# Patient Record
Sex: FEMALE | Race: WHITE
[De-identification: names, ages, dates, MRNs, and addresses within clinical notes are randomized per-mention and may not be internally consistent; named-entity substitution may affect disease eponyms.]

---

## 2022-05-10 ENCOUNTER — HOSPITAL ENCOUNTER (EMERGENCY)
Dept: HOSPITAL 75 - ER | Age: 42
Discharge: HOME | End: 2022-05-10
Payer: COMMERCIAL

## 2022-05-10 VITALS — SYSTOLIC BLOOD PRESSURE: 110 MMHG | DIASTOLIC BLOOD PRESSURE: 44 MMHG

## 2022-05-10 VITALS — HEIGHT: 62.99 IN | BODY MASS INDEX: 43.05 KG/M2 | WEIGHT: 242.95 LBS

## 2022-05-10 DIAGNOSIS — Z32.02: ICD-10-CM

## 2022-05-10 DIAGNOSIS — N83.292: Primary | ICD-10-CM

## 2022-05-10 LAB
ALBUMIN SERPL-MCNC: 4.1 GM/DL (ref 3.2–4.5)
ALP SERPL-CCNC: 107 U/L (ref 40–136)
ALT SERPL-CCNC: 12 U/L (ref 0–55)
APTT PPP: YELLOW S
BACTERIA #/AREA URNS HPF: (no result) /HPF
BASOPHILS # BLD AUTO: 0 10^3/UL (ref 0–0.1)
BASOPHILS NFR BLD AUTO: 0 % (ref 0–10)
BILIRUB SERPL-MCNC: 0.5 MG/DL (ref 0.1–1)
BILIRUB UR QL STRIP: NEGATIVE
BUN/CREAT SERPL: 11
CALCIUM SERPL-MCNC: 9.4 MG/DL (ref 8.5–10.1)
CHLORIDE SERPL-SCNC: 103 MMOL/L (ref 98–107)
CO2 SERPL-SCNC: 19 MMOL/L (ref 21–32)
CREAT SERPL-MCNC: 0.66 MG/DL (ref 0.6–1.3)
EOSINOPHIL # BLD AUTO: 0.1 10^3/UL (ref 0–0.3)
EOSINOPHIL NFR BLD AUTO: 1 % (ref 0–10)
FIBRINOGEN PPP-MCNC: CLEAR MG/DL
GFR SERPLBLD BASED ON 1.73 SQ M-ARVRAT: 114 ML/MIN
GLUCOSE SERPL-MCNC: 197 MG/DL (ref 70–105)
GLUCOSE UR STRIP-MCNC: (no result) MG/DL
HCT VFR BLD CALC: 42 % (ref 35–52)
HGB BLD-MCNC: 13.4 G/DL (ref 11.5–16)
KETONES UR QL STRIP: NEGATIVE
LEUKOCYTE ESTERASE UR QL STRIP: NEGATIVE
LIPASE SERPL-CCNC: 10 U/L (ref 8–78)
LYMPHOCYTES # BLD AUTO: 3.2 10^3/UL (ref 1–4)
LYMPHOCYTES NFR BLD AUTO: 21 % (ref 12–44)
MANUAL DIFFERENTIAL PERFORMED BLD QL: YES
MCH RBC QN AUTO: 25 PG (ref 25–34)
MCHC RBC AUTO-ENTMCNC: 32 G/DL (ref 32–36)
MCV RBC AUTO: 77 FL (ref 80–99)
MONOCYTES # BLD AUTO: 0.7 10^3/UL (ref 0–1)
MONOCYTES NFR BLD AUTO: 5 % (ref 0–12)
MONOCYTES NFR BLD: 4 %
NEUTROPHILS # BLD AUTO: 10.8 10^3/UL (ref 1.8–7.8)
NEUTROPHILS NFR BLD AUTO: 73 % (ref 42–75)
NEUTS BAND NFR BLD MANUAL: 79 %
NITRITE UR QL STRIP: NEGATIVE
PH UR STRIP: 6 [PH] (ref 5–9)
PLATELET # BLD: 538 10^3/UL (ref 130–400)
PMV BLD AUTO: 8.8 FL (ref 9–12.2)
POTASSIUM SERPL-SCNC: 4.1 MMOL/L (ref 3.6–5)
PROT SERPL-MCNC: 8 GM/DL (ref 6.4–8.2)
PROT UR QL STRIP: NEGATIVE
RBC #/AREA URNS HPF: (no result) /HPF
RBC MORPH BLD: NORMAL
SODIUM SERPL-SCNC: 137 MMOL/L (ref 135–145)
SP GR UR STRIP: 1.01 (ref 1.02–1.02)
SQUAMOUS #/AREA URNS HPF: (no result) /HPF
VARIANT LYMPHS NFR BLD MANUAL: 17 %
WBC # BLD AUTO: 15 10^3/UL (ref 4.3–11)
WBC #/AREA URNS HPF: (no result) /HPF

## 2022-05-10 PROCEDURE — 85007 BL SMEAR W/DIFF WBC COUNT: CPT

## 2022-05-10 PROCEDURE — 36415 COLL VENOUS BLD VENIPUNCTURE: CPT

## 2022-05-10 PROCEDURE — 86141 C-REACTIVE PROTEIN HS: CPT

## 2022-05-10 PROCEDURE — 84703 CHORIONIC GONADOTROPIN ASSAY: CPT

## 2022-05-10 PROCEDURE — 80053 COMPREHEN METABOLIC PANEL: CPT

## 2022-05-10 PROCEDURE — 85027 COMPLETE CBC AUTOMATED: CPT

## 2022-05-10 PROCEDURE — 76856 US EXAM PELVIC COMPLETE: CPT

## 2022-05-10 PROCEDURE — 74177 CT ABD & PELVIS W/CONTRAST: CPT

## 2022-05-10 PROCEDURE — 76830 TRANSVAGINAL US NON-OB: CPT

## 2022-05-10 PROCEDURE — 81000 URINALYSIS NONAUTO W/SCOPE: CPT

## 2022-05-10 PROCEDURE — 83690 ASSAY OF LIPASE: CPT

## 2022-05-10 NOTE — DIAGNOSTIC IMAGING REPORT
PROCEDURE: CT abdomen and pelvis with contrast.



TECHNIQUE: Multiple contiguous axial images were obtained through

the abdomen and pelvis after administration of intravenous

contrast. Auto Exposure Controls were utilized during the CT exam

to meet ALARA standards for radiation dose reduction. All CT

scans use one or more of the following dose optimizing

techniques: automated exposure control, MA and/or KvP adjustment

based on patient size and exam type or iterative reconstruction.



INDICATION:  Abdominal pain. Leukocytosis. 



COMPARISON: CT abdomen and pelvis with contrast 06/07/2010.



FINDINGS: Lung bases are clear. There is a small amount of fluid

layering in the colic gutters bilaterally. Nonspecific cyst in

the left ovary measuring up to 4.1 x 3.2 cm. Normal appendix. No

free intraperitoneal air. No evidence of bowel obstruction. No

lymphadenopathy. The liver, gallbladder, pancreas, adrenals,

kidneys, collecting systems and bladder are negative. Borderline

splenomegaly. The endometrium is distended with fluid including a

small amount of blood in the cervical os. No acute osseous

findings.



IMPRESSION: 

1. Small amount of fluid layering in the colic gutters

bilaterally. No free intraperitoneal air.

2. Nonspecific cyst in the left ovary measuring up to 4.1 x 3.2

cm. Recommend further evaluation with pelvic ultrasound.

3. Borderline splenomegaly.



Dictated by: 



  Dictated on workstation # [id number]

## 2022-05-10 NOTE — DIAGNOSTIC IMAGING REPORT
PROCEDURE: US Non-ob pelvis comp/trans.



TECHNIQUE:  Multiple realtime grayscale images were obtained of

the pelvis in various projections endovaginally.



Transabdominal imaging was also performed.



INDICATION:  Abdominal pain



Left ovarian cyst measures 3.5 x 3.0 cm and showed no

vascularized component or appreciable complexity. Endometrium is

nonfocal but thickened at 2.2 cm, likely proliferative. There is

no fibroid or myometrial mass. There are multiple subcentimeter

cervical nabothian cysts. Pelvic free fluid is predominantly in

the left adnexa and visualized lower colic gutters. There are

some internal echoes within the free fluid which may reflect

sequelae of a ruptured hemorrhagic cyst. No mass. 



IMPRESSION:  

1.  Likely proliferative endometrium and incidental cervical

nabothian cyst, small to moderate pelvic free fluid with internal

echoes raising the question of a ruptured hemorrhagic cyst. A

left ovarian cyst sonographically appeared simple and measures

3.5 cm. 

2.  No findings of adnexal torsion.



Dictated by: 



  Dictated on workstation # [id number]

## 2022-08-02 ENCOUNTER — HOSPITAL ENCOUNTER (OUTPATIENT)
Dept: HOSPITAL 75 - PREOP | Age: 42
LOS: 17 days | Discharge: HOME | End: 2022-08-19
Attending: OBSTETRICS & GYNECOLOGY
Payer: COMMERCIAL

## 2022-08-02 VITALS — HEIGHT: 62.99 IN | WEIGHT: 242.95 LBS | BODY MASS INDEX: 43.05 KG/M2

## 2022-08-02 DIAGNOSIS — Z01.818: Primary | ICD-10-CM

## 2022-08-02 DIAGNOSIS — N84.0: ICD-10-CM

## 2022-08-25 ENCOUNTER — HOSPITAL ENCOUNTER (OUTPATIENT)
Dept: HOSPITAL 75 - SDC | Age: 42
End: 2022-08-25
Attending: OBSTETRICS & GYNECOLOGY
Payer: COMMERCIAL

## 2022-08-25 VITALS — DIASTOLIC BLOOD PRESSURE: 71 MMHG | SYSTOLIC BLOOD PRESSURE: 104 MMHG

## 2022-08-25 VITALS — BODY MASS INDEX: 43.05 KG/M2 | WEIGHT: 242.95 LBS | HEIGHT: 62.99 IN

## 2022-08-25 VITALS — SYSTOLIC BLOOD PRESSURE: 122 MMHG | DIASTOLIC BLOOD PRESSURE: 90 MMHG

## 2022-08-25 VITALS — SYSTOLIC BLOOD PRESSURE: 110 MMHG | DIASTOLIC BLOOD PRESSURE: 63 MMHG

## 2022-08-25 VITALS — DIASTOLIC BLOOD PRESSURE: 51 MMHG | SYSTOLIC BLOOD PRESSURE: 100 MMHG

## 2022-08-25 VITALS — DIASTOLIC BLOOD PRESSURE: 70 MMHG | SYSTOLIC BLOOD PRESSURE: 99 MMHG

## 2022-08-25 VITALS — SYSTOLIC BLOOD PRESSURE: 107 MMHG | DIASTOLIC BLOOD PRESSURE: 53 MMHG

## 2022-08-25 VITALS — DIASTOLIC BLOOD PRESSURE: 56 MMHG | SYSTOLIC BLOOD PRESSURE: 101 MMHG

## 2022-08-25 VITALS — DIASTOLIC BLOOD PRESSURE: 57 MMHG | SYSTOLIC BLOOD PRESSURE: 116 MMHG

## 2022-08-25 DIAGNOSIS — D25.1: ICD-10-CM

## 2022-08-25 DIAGNOSIS — N85.8: ICD-10-CM

## 2022-08-25 DIAGNOSIS — D26.1: Primary | ICD-10-CM

## 2022-08-25 DIAGNOSIS — E66.9: ICD-10-CM

## 2022-08-25 DIAGNOSIS — N92.1: ICD-10-CM

## 2022-08-25 PROCEDURE — 87081 CULTURE SCREEN ONLY: CPT

## 2022-08-25 PROCEDURE — 84703 CHORIONIC GONADOTROPIN ASSAY: CPT

## 2022-08-25 NOTE — OPERATIVE REPORT
DATE OF SERVICE:  08/25/2022



PREOPERATIVE DIAGNOSES:

Uterine fibroids, menometrorrhagia, and endocervical polyp.



POSTOPERATIVE DIAGNOSES:

Uterine fibroids, menometrorrhagia, and endocervical polyp.



PROCEDURE:

Hysteroscopy, D and C.



SURGEON:

Erin Foster MD



ANESTHESIA:

General.



ESTIMATED BLOOD LOSS:

Less than 50 mL.



COMPLICATIONS:

None.



CONDITION:

Stable.



FINDINGS:

Thickened endometrium throughout without _____ masses appreciated.  Bilateral

tubal ostia visualized without difficulty and suspected endocervical polyp stump

noted within the cavity.



PROCEDURE IN DETAIL:

After the risks, benefits and alternatives of the procedure were described to

the patient, she was taken to the operating room where general anesthesia was

obtained without difficulty.  She was placed in dorsal lithotomy position and

prepped and draped in the usual sterile fashion with SCDs in place.  A weighted

speculum was placed in the anterior lip of the cervix was grasped with a single

tooth tenaculum and a paracervical block was placed.  The cervix was dilated to

accommodate the TruClear hysteroscope, which was advanced under direct

visualization without difficulty.  The cavity was surveyed with the above

findings and the incisor was used to obtain endometrial sampling throughout the

cavity.  As the hysteroscope was withdrawn from the endocervix, the polyp stump

was noted and likewise removed.  All instruments were removed from the patient's

vagina.



The patient tolerated the procedure well.  Sponge, lap, needle and instrument

counts were correct, and she was taken to the recovery room awake and in stable

condition.





Job ID: [id number]

DocumentID: [id number]

Dictated Date:  08/25/2022 10:59:07

Transcription Date: 08/25/2022 12:08:28

Dictated By: Erin Foster MD

## 2022-08-25 NOTE — PROGRESS NOTE-PRE OPERATIVE
Pre-Operative Progress Note


Date H&P Reviewed:  Aug 25, 2022


Time H&P Reviewed:  10:00


History & Physical:  H&P Reviewed, Patient Examed, No changes noted


Pre-Operative Diagnosis:  menorrhagia, uterine fibroids, endocervical polyp, 

thickened endometrium











JOEL WANG MD               Aug 25, 2022 10:04

## 2022-09-02 NOTE — ANESTHESIA-GENERAL POST-OP
General


Patient Condition


Mental Status/LOC:  Same as Preop


Cardiovascular:  Satisfactory


Nausea/Vomiting:  Absent


Respiratory:  Satisfactory


Pain:  Controlled


Complications:  Absent





Post Op Complications


Complications


None





Follow Up Care/Instructions


Patient Instructions


None needed.





Anesthesia/Patient Condition


Patient Condition


Patient is doing well, no complaints, stable vital signs, no apparent adverse 

anesthesia problems.   


No complications reported per nursing.


D/C home per [facility] Criteria:  Yes











MAKAYLA HAMPTON CRNA            Sep 2, 2022 13:01